# Patient Record
Sex: MALE | Race: BLACK OR AFRICAN AMERICAN
[De-identification: names, ages, dates, MRNs, and addresses within clinical notes are randomized per-mention and may not be internally consistent; named-entity substitution may affect disease eponyms.]

---

## 2020-01-16 ENCOUNTER — HOSPITAL ENCOUNTER (INPATIENT)
Dept: HOSPITAL 35 - ER | Age: 65
LOS: 4 days | Discharge: HOME | DRG: 281 | End: 2020-01-20
Attending: HOSPITALIST | Admitting: HOSPITALIST
Payer: COMMERCIAL

## 2020-01-16 VITALS — SYSTOLIC BLOOD PRESSURE: 140 MMHG | DIASTOLIC BLOOD PRESSURE: 78 MMHG

## 2020-01-16 VITALS — DIASTOLIC BLOOD PRESSURE: 94 MMHG | SYSTOLIC BLOOD PRESSURE: 169 MMHG

## 2020-01-16 VITALS — HEIGHT: 60 IN | WEIGHT: 162.9 LBS | BODY MASS INDEX: 31.98 KG/M2

## 2020-01-16 VITALS — SYSTOLIC BLOOD PRESSURE: 146 MMHG | DIASTOLIC BLOOD PRESSURE: 81 MMHG

## 2020-01-16 VITALS — SYSTOLIC BLOOD PRESSURE: 146 MMHG | DIASTOLIC BLOOD PRESSURE: 85 MMHG

## 2020-01-16 VITALS — DIASTOLIC BLOOD PRESSURE: 78 MMHG | SYSTOLIC BLOOD PRESSURE: 140 MMHG

## 2020-01-16 VITALS — SYSTOLIC BLOOD PRESSURE: 136 MMHG | DIASTOLIC BLOOD PRESSURE: 80 MMHG

## 2020-01-16 DIAGNOSIS — E83.42: ICD-10-CM

## 2020-01-16 DIAGNOSIS — F10.10: ICD-10-CM

## 2020-01-16 DIAGNOSIS — Z79.899: ICD-10-CM

## 2020-01-16 DIAGNOSIS — I42.8: ICD-10-CM

## 2020-01-16 DIAGNOSIS — I11.0: ICD-10-CM

## 2020-01-16 DIAGNOSIS — Z83.2: ICD-10-CM

## 2020-01-16 DIAGNOSIS — K80.12: ICD-10-CM

## 2020-01-16 DIAGNOSIS — I25.10: ICD-10-CM

## 2020-01-16 DIAGNOSIS — M19.90: ICD-10-CM

## 2020-01-16 DIAGNOSIS — I21.4: Primary | ICD-10-CM

## 2020-01-16 DIAGNOSIS — I50.9: ICD-10-CM

## 2020-01-16 DIAGNOSIS — E87.6: ICD-10-CM

## 2020-01-16 DIAGNOSIS — J32.9: ICD-10-CM

## 2020-01-16 DIAGNOSIS — Z83.6: ICD-10-CM

## 2020-01-16 DIAGNOSIS — Z90.89: ICD-10-CM

## 2020-01-16 DIAGNOSIS — F17.210: ICD-10-CM

## 2020-01-16 LAB
ALBUMIN SERPL-MCNC: 3.9 G/DL (ref 3.4–5)
ALT SERPL-CCNC: 71 U/L (ref 30–65)
ANION GAP SERPL CALC-SCNC: 11 MMOL/L (ref 7–16)
AST SERPL-CCNC: 102 U/L (ref 15–37)
BILIRUB SERPL-MCNC: 0.7 MG/DL
BUN SERPL-MCNC: 9 MG/DL (ref 7–18)
CALCIUM SERPL-MCNC: 9.5 MG/DL (ref 8.5–10.1)
CHLORIDE SERPL-SCNC: 98 MMOL/L (ref 98–107)
CHOLEST SERPL-MCNC: 131 MG/DL (ref ?–200)
CO2 SERPL-SCNC: 30 MMOL/L (ref 21–32)
CREAT SERPL-MCNC: 0.9 MG/DL (ref 0.7–1.3)
ERYTHROCYTE [DISTWIDTH] IN BLOOD BY AUTOMATED COUNT: 16.4 % (ref 10.5–14.5)
GLUCOSE SERPL-MCNC: 126 MG/DL (ref 74–106)
GRANULOCYTES NFR BLD MANUAL: 59 % (ref 36–66)
HCT VFR BLD CALC: 45.1 % (ref 42–52)
HDLC SERPL-MCNC: 58 MG/DL (ref 40–?)
HGB BLD-MCNC: 15.3 GM/DL (ref 14–18)
LDLC SERPL-MCNC: 66 MG/DL (ref ?–100)
LIPASE: 149 U/L (ref 73–393)
LYMPHOCYTES NFR BLD AUTO: 22 % (ref 24–44)
MAGNESIUM SERPL-MCNC: 1.6 MG/DL (ref 1.8–2.4)
MCH RBC QN AUTO: 34.7 PG (ref 26–34)
MCHC RBC AUTO-ENTMCNC: 33.9 G/DL (ref 28–37)
MCV RBC: 102.3 FL (ref 80–100)
MONOCYTES NFR BLD: 18 % (ref 1–8)
NEUTROPHILS # BLD: 3.2 THOU/UL (ref 1.4–8.2)
NEUTS BAND NFR BLD: 1 % (ref 0–8)
PHOSPHATE SERPL-MCNC: 3.5 MG/DL (ref 2.5–4.9)
PLATELET # BLD EST: NORMAL 10*3/UL
PLATELET # BLD: 221 THOU/UL (ref 150–400)
POTASSIUM SERPL-SCNC: 3.1 MMOL/L (ref 3.5–5.1)
PROT SERPL-MCNC: 8.8 G/DL (ref 6.4–8.2)
RBC # BLD AUTO: 4.41 MIL/UL (ref 4.5–6)
RBC MORPH BLD: NORMAL
SODIUM SERPL-SCNC: 139 MMOL/L (ref 136–145)
TC:HDL: 2.3 RATIO
TRIGL SERPL-MCNC: 36 MG/DL (ref ?–150)
TROPONIN I SERPL-MCNC: 0.13 NG/ML (ref ?–0.06)
VLDLC SERPL CALC-MCNC: 7 MG/DL (ref ?–40)
WBC # BLD AUTO: 5.4 THOU/UL (ref 4–11)

## 2020-01-16 PROCEDURE — 4A023N8 MEASUREMENT OF CARDIAC SAMPLING AND PRESSURE, BILATERAL, PERCUTANEOUS APPROACH: ICD-10-PCS | Performed by: HOSPITALIST

## 2020-01-16 PROCEDURE — 10081 I&D PILONIDAL CYST COMP: CPT

## 2020-01-16 PROCEDURE — B4101ZZ FLUOROSCOPY OF ABDOMINAL AORTA USING LOW OSMOLAR CONTRAST: ICD-10-PCS | Performed by: HOSPITALIST

## 2020-01-16 PROCEDURE — B2111ZZ FLUOROSCOPY OF MULTIPLE CORONARY ARTERIES USING LOW OSMOLAR CONTRAST: ICD-10-PCS | Performed by: HOSPITALIST

## 2020-01-16 PROCEDURE — B2151ZZ FLUOROSCOPY OF LEFT HEART USING LOW OSMOLAR CONTRAST: ICD-10-PCS | Performed by: HOSPITALIST

## 2020-01-16 NOTE — CATHLAB
[facility]
[address]
[place]  [address]
Phone:  [phone]                    INVASIVE PROCEDURE REPORT     
_______________________________________________________________________________
 
Name:            KAPLANQUEENIE HERLINDA BARRIENTOS           Room #:        216-P       [facility] [place]
[facility]#:           [id number]          Account #:     [id number]  
Admission:       01/16/20         Attend Phys:   Cornelio Barahona MD 
Discharge:                  Date of Birth: [birth year]  
                         Report #:      [id number]
        [id number]
_______________________________________________________________________________
THIS REPORT FOR:   //name//                          
 
 
--------------- APPROVED REPORT --------------
 
 
Study performed:  01/16/2020 10:32:24
 
Patient Details
Patient Status: In-Patient                  Room #: 
The patient is a 64 year-old male
 
Event Personnel
Aramis Monaco  Interventional Cardiologist, Mickie Live RTR, 
RCIS Scrub, Sandifer, David Monitor, Silvia Magana RN 
RN
 
Procedures Performed
Right and Left Heart Cath w/or w/o Coronarie [id number] [facility] Aortogram 
Abdominal Peripheral Angio [id number]
 
Indication
Chest pain
 
Procedure Narrative
The Right Groin^ was infiltrated with 1% Lidocaine subcutaneous 
anesthesia. A PINNACLE 6FR Sheath #[id number] sheath was inserted into 
the RFA^. Coronary angiography was performed using coronary 
diagnostic catheters. The right coronary system was accessed and 
visualized with a 6FR 3DRC #[id number] catheter. The left coronary system 
was accessed and visualized with a JL4 catheter. The left ventricle 
was accessed and visualized with a PIGTAIL catheter. Left 
ventriculogram was performed in 30 degree projection. An aortogram of 
the abdominal aorta was performed. Closure device was deployed with a 
6 Fr MYNXGRIP 6/7F #[id number]. The patient tolerated the procedure well 
and there were no complications associated with the procedure. 
 
Intraoperative Conscious Sedation
Sedation start time:  11.14           Case end Time:  
11.54    
Fentanyl  100 mcg    Versed  2 mg  
 
Fluoro Time:    3.12 minutes    
Dose:     DAP 3.00 cGycm2  343 mGy  
Contrast Type and Amount:  Omnipaque 125 ml   
 
 
 
[facility]
[address]
[place]  [address]
Phone:  [phone]                    INVASIVE PROCEDURE REPORT     
_______________________________________________________________________________
 
Name:            QUEENIE KAPLAN            Room #:        216-P       [facility] [place]
..#:           [id number]          Account #:     [id number]  
Admission:       01/16/20         Attend Phys:   Cornelio Barahona MD 
Discharge:                  Date of Birth: [birth year]  
                         Report #:      [id number]
        [id number]
_______________________________________________________________________________
Hemodynamics
The right atrial mean pressure is 3 mmHg. The right ventricular 
pressure is 34/3 mmHg. The pulmonary artery pressure is 30/8 mmHg 
with a mean of 18 mmHg. The mean pulmonary capillary wedge pressure 
is 11 mmHg. The aortic pressure is 137/75 mmHg with a mean of 100 
mmHg. The left ventricular pressure is 125/6 mmHg with a mean of 
mmHg. The left ventricular end diastolic pressure is 19 mmHg. The 
cardiac output using thermo method is 5.20 L/min.
 
Conclusion
#1 mildly dilated left ventricle with moderate global hypokinesis EF 
25-30% range.
#2 abdominal aorta mildly ectatic there is moderate ostial iliac 
disease of 40% bilaterally no aneurysm
#3 left main long with a distal eccentric concentric lesion 30-40% 
giving rise to LAD circumflex and ramus branch
#4 LAD extends around the apex with mild irregularity proximal 
calcification 3040% ostial disease
#5 a ramus intermedius also is a 3040% ostial disease moderate 
distribution
#6 circumflex OM nondominant with an eccentric 30% lesion off the 
left main otherwise preserved vessel.
#7 dominant right with a diffuse distal disease only small PDA 
system. No occlusive disease noted
#8 successful right heart catheterization with hemodynamics and 
cardiac output by thermodilution as described above.
 
Recommendations and plan: Continue aggressive risk factor 
modification. No coronary intervention. Pulmonary pressures are 
within normal range. 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 
be alcohol in nature. Transfer to CCU in stable condition.
 
 
 
 
 
 
 
 
 
 
 
 
 
  <ELECTRONICALLY SIGNED>
   By: Aramis Monaco MD, FACC   
  01/16/20     1325
D: 01/16/20 1325                           _____________________________________
T: 01/16/20 1325                           Aramis Monaco MD, FACC     /INF

## 2020-01-16 NOTE — EKG
[place] [address] [email or web address] [address]
[place]  [address]
Phone:  [phone]                    ELECTROCARDIOGRAM REPORT      
_______________________________________________________________________________
 
Name:       QUEENIE KAPLAN HELRINDA            Room #:         216-P       [facility] [place]  
M.R.#:      [id number]     Account #:      [id number]  
Admission:  20    Attend Phys:    Cornelio Barhaona MD     
Discharge:              Date of Birth:  [birth year]  
                                                          Report #: [id number]
   [id number]
_______________________________________________________________________________
THIS REPORT FOR:   //name//                          
 
                         [facility] ED
                                       
Test Date:    2020               Test Time:    00:30:03
Pat Name:     QUEENIE KAPLAN                Department:   
Patient ID:   [id number]            Room:         216
Gender:       M                        Technician:   JOHNNY
:          1955               Requested By: Miguel Carias
Order Number: [id number] MD:   Christopher Jaramillo
                                 Measurements
Intervals                              Axis          
Rate:         97                       P:            
WY:                                    QRS:          49
QRSD:         110                      T:            91
QT:           419                                    
QTc:          533                                    
                           Interpretive Statements
Normal sinus rhythm.  
Left ventricular hypertrophy
Nonspecific T abnormalities, lateral leads
Baseline wander in lead(s) V2
No previous ECG available for comparison
 
Electronically Signed On 2020 17:21:55 CST by Christopher Jaramillo
[email or web address]
 
 
 
 
 
 
 
 
 
 
 
 
 
 
 
 
 
  <ELECTRONICALLY SIGNED>
   By: Christopher Jaramillo MD        
  20     1721
D: 20                           _____________________________________
T: 20                           Christopher Jaramillo MD          /TREMAINE

## 2020-01-16 NOTE — EKG
[place] [address] [address] [facility] [address]
[place]  [address]
Phone:  [phone]                    ELECTROCARDIOGRAM REPORT      
_______________________________________________________________________________
 
Name:       KAPLANQUEENIE HERLINDA BARRIENTOS           Room #:         216-P       [facility] [place]  
M.R.#:      [id number]     Account #:      [id number]  
Admission:  20    Attend Phys:    Cornelio Barahona MD     
Discharge:              Date of Birth:  [birth year]  
                                                          Report #: [id number]
   [id number]
_______________________________________________________________________________
THIS REPORT FOR:   //name//                          
 
                          [facility]
                                       
Test Date:    2020               Test Time:    09:18:43
Pat Name:     QUEENIE KAPLAN                Department:   
Patient ID:   [id number]            Room:         216 P
Gender:       M                        Technician:   KATYA MARY
:          1955               Requested By: Lauren Bragg
Order Number: [id number] MD:   Christopher Jaramillo
                                 Measurements
Intervals                              Axis          
Rate:         110                      P:            71
LA:           140                      QRS:          17
QRSD:         100                      T:            69
QT:           387                                    
QTc:          524                                    
                           Interpretive Statements
Sinus tachycardia
Ventricular premature complex
Aberrant complex
Probable left atrial enlargement
Left ventricular hypertrophy
No previous ECG available for comparison
 
Electronically Signed On 2020 17:25:28 CST by Christopher Jaramillo
[email or web address]
 
 
 
 
 
 
 
 
 
 
 
 
 
 
 
 
  <ELECTRONICALLY SIGNED>
   By: Christopher Jaramillo MD        
  20     1725
D: 20                           _____________________________________
T: 20                           MD MILES Guevara

## 2020-01-16 NOTE — 2DMMODE
[facility]
[address]
[place]  [address]
Phone:  [phone] 2 D/M-MODE ECHOCARDIOGRAM     
_______________________________________________________________________________
 
Name:            EUSEBIOQUEENIE HERLINDA BARRIENTOS           Room #:        216-P       [facility] [place]
[facility]#:           [id number]          Account #:     [id number]  
Admission:       01/16/20         Attend Phys:   Cornelio Barahona MD 
Discharge:                  Date of Birth: [birth year]  
                         Report #:      [id number]
        [id number]
_______________________________________________________________________________
THIS REPORT FOR:   //name//                          
 
 
--------------- APPROVED REPORT --------------
 
 
Study performed:  01/16/2020 09:41:08
 
EXAM: Comprehensive 2D, Doppler, and color-flow 
Echocardiogram 
Patient Location: Echo lab   
      Status:  routine
 
       BSA:         1.81
HR: 105 bpm  BP:          146/81 mmHg 
Rhythm: NSR/Tachy/PVCs.     
 
Other Information 
Study Quality: Good
 
Indications
Elevated troponin.
 
2D Dimensions
RVDd:  35.92 mm  
IVSd:  10.17 (7-11mm) LVOT Diam:  21.40 (18-24mm) 
LVDd:  57.31 mm  
PWd:  8.95 (7-11mm) Ascending Ao:  32.38 (22-36mm)
LVDs:  51.23 (25-40mm) 
Aortic Root:  36.35 mm 
 
Volumes
Left Atrial Volume (Systole) 
Single Plane 4CH:  61.27 mL Single Plane 2CH:  77.67 mL
    LA ESV Index:  42.00 mL/m2
 
Aortic Valve
AoV Peak Vishnu.:  1.25 m/s 
AO Peak Gr.:  6.25 mmHg  LVOT Max PG:  3.12 mmHg
    LVOT Max V:  0.88 m/s
BEE Vmax: 2.54 cm2  
 
Mitral Valve
IVRT:  51.90 ms 
 
Pulmonary Valve
 
 
[facility]
[address]
[place]  [address]
Phone:  [phone]                    2 D/M-MODE ECHOCARDIOGRAM     
_______________________________________________________________________________
 
Name:            QUEENIE KAPLAN            Room #:        216-P       [facility] [place]
..#:           [id number]          Account #:     [id number]  
Admission:       01/16/20         Attend Phys:   Cornelio Barahona MD 
Discharge:                  Date of Birth: [birth year]  
                         Report #:      [id number]
        [id number]
_______________________________________________________________________________
PV Peak Vishnu.:  0.85 m/s PV Peak Gr.:  2.91 mmHg
 
Pulmonary Vein
P Vein S:    0.62 m/s 
P Vein D:   0.31 m/s 
P Vein S/D Ratio:  2.00 
 
Tricuspid Valve
 RAP Estimate:  5.00 mmHg
 
Left Ventricle
Left ventricle is at the upper limits of normal. There is normal left 
ventricular wall thickness. Left ventricular systolic function is 
mod-severely decreased.globaaly LVEF is 30%. This study is not 
technically sufficient to allow evaluation of the LV diastolic 
function.
 
Right Ventricle
The right ventricle is normal size. The right ventricular systolic 
function is normal.
 
Atria
Left atrium is mildly dilated. The right atrium size is 
normal.
 
Aortic Valve
The aortic valve is normal in structure. No aortic regurgitation is 
present. There is no aortic valvular stenosis.
 
Mitral Valve
The mitral valve is normal in structure. Moderate mitral 
regurgitation.
 
Tricuspid Valve
The tricuspid valve is normal in structure. There is no tricuspid 
valve regurgitation noted. Unable to assess PA pressure.
 
Pulmonic Valve
The pulmonary valve is normal in structure. Trace pulmonic 
regurgitation.
 
Great Vessels
The aortic root is normal in size. The ascending aorta is normal in 
size. IVC is normal in size and collapses >50% with 
inspiration.
 
 
 
[facility]
[address]
[place]  [address]
Phone:  [phone]                    2 D/M-MODE ECHOCARDIOGRAM     
_______________________________________________________________________________
 
Name:            QUEENIE KAPLAN            Room #:        216-P       [facility] [place]
[facility].#:           [id number]          Account #:     [id number]  
Admission:       01/16/20         Attend Phys:   Cornelio Barahona MD 
Discharge:                  Date of Birth: [birth year]  
                         Report #:      [id number]
        [id number]
_______________________________________________________________________________
Pericardium
There is no pericardial effusion.
 
<Conclusion>
Left ventricle is at the upper limits of normal.
Left ventricular systolic function is mod-severely 
decreased.globaaly
LVEF is 30%.
This study is not technically sufficient to allow evaluation of the 
LV diastolic function.
The right ventricle is normal size.
Left atrium is mildly dilated.
The aortic valve is normal in structure.
Moderate mitral regurgitation.
There is no tricuspid valve regurgitation noted.
Unable to assess PA pressure.
The aortic root is normal in size.
There is no pericardial effusion.
 
 
 
 
 
 
 
 
 
 
 
 
 
 
 
 
 
 
 
 
 
 
 
 
 
 
  <ELECTRONICALLY SIGNED>
   By: Aramis Monaco MD, [facility]   
  01/16/20     1053
D: 01/16/20 1053                           _____________________________________
T: 01/16/20 1053                           Aramis Monaco MD, FACC     /INF

## 2020-01-17 VITALS — SYSTOLIC BLOOD PRESSURE: 138 MMHG | DIASTOLIC BLOOD PRESSURE: 91 MMHG

## 2020-01-17 VITALS — SYSTOLIC BLOOD PRESSURE: 138 MMHG | DIASTOLIC BLOOD PRESSURE: 73 MMHG

## 2020-01-17 VITALS — SYSTOLIC BLOOD PRESSURE: 123 MMHG | DIASTOLIC BLOOD PRESSURE: 60 MMHG

## 2020-01-17 LAB
ANION GAP SERPL CALC-SCNC: 8 MMOL/L (ref 7–16)
BUN SERPL-MCNC: 9 MG/DL (ref 7–18)
CALCIUM SERPL-MCNC: 8.8 MG/DL (ref 8.5–10.1)
CHLORIDE SERPL-SCNC: 102 MMOL/L (ref 98–107)
CO2 SERPL-SCNC: 26 MMOL/L (ref 21–32)
CREAT SERPL-MCNC: 1 MG/DL (ref 0.7–1.3)
ERYTHROCYTE [DISTWIDTH] IN BLOOD BY AUTOMATED COUNT: 16.6 % (ref 10.5–14.5)
GLUCOSE SERPL-MCNC: 85 MG/DL (ref 74–106)
HCT VFR BLD CALC: 43.5 % (ref 42–52)
HGB BLD-MCNC: 14.7 GM/DL (ref 14–18)
MAGNESIUM SERPL-MCNC: 1.7 MG/DL (ref 1.8–2.4)
MCH RBC QN AUTO: 34.5 PG (ref 26–34)
MCHC RBC AUTO-ENTMCNC: 33.7 G/DL (ref 28–37)
MCV RBC: 102.4 FL (ref 80–100)
PLATELET # BLD: 201 THOU/UL (ref 150–400)
POTASSIUM SERPL-SCNC: 3.7 MMOL/L (ref 3.5–5.1)
RBC # BLD AUTO: 4.25 MIL/UL (ref 4.5–6)
SODIUM SERPL-SCNC: 136 MMOL/L (ref 136–145)
WBC # BLD AUTO: 11 THOU/UL (ref 4–11)

## 2020-01-18 VITALS — SYSTOLIC BLOOD PRESSURE: 142 MMHG | DIASTOLIC BLOOD PRESSURE: 80 MMHG

## 2020-01-18 VITALS — SYSTOLIC BLOOD PRESSURE: 156 MMHG | DIASTOLIC BLOOD PRESSURE: 75 MMHG

## 2020-01-18 VITALS — SYSTOLIC BLOOD PRESSURE: 123 MMHG | DIASTOLIC BLOOD PRESSURE: 71 MMHG

## 2020-01-18 VITALS — SYSTOLIC BLOOD PRESSURE: 145 MMHG | DIASTOLIC BLOOD PRESSURE: 89 MMHG

## 2020-01-18 VITALS — SYSTOLIC BLOOD PRESSURE: 156 MMHG | DIASTOLIC BLOOD PRESSURE: 89 MMHG

## 2020-01-18 LAB
ALBUMIN SERPL-MCNC: 2.4 G/DL (ref 3.4–5)
ANION GAP SERPL CALC-SCNC: 11 MMOL/L (ref 7–16)
BILIRUB UR-MCNC: (no result) MG/DL
BUN SERPL-MCNC: 11 MG/DL (ref 7–18)
CALCIUM SERPL-MCNC: 8.2 MG/DL (ref 8.5–10.1)
CHLORIDE SERPL-SCNC: 101 MMOL/L (ref 98–107)
CO2 SERPL-SCNC: 23 MMOL/L (ref 21–32)
COLOR UR: YELLOW
CREAT SERPL-MCNC: 0.9 MG/DL (ref 0.7–1.3)
ERYTHROCYTE [DISTWIDTH] IN BLOOD BY AUTOMATED COUNT: 16.5 % (ref 10.5–14.5)
GLUCOSE SERPL-MCNC: 87 MG/DL (ref 74–106)
HCT VFR BLD CALC: 38.9 % (ref 42–52)
HGB BLD-MCNC: 12.9 GM/DL (ref 14–18)
KETONES UR STRIP-MCNC: (no result) MG/DL
MCH RBC QN AUTO: 34.2 PG (ref 26–34)
MCHC RBC AUTO-ENTMCNC: 33.3 G/DL (ref 28–37)
MCV RBC: 102.7 FL (ref 80–100)
OPIATES UR-MCNC: POSITIVE NG/ML
PHOSPHATE SERPL-MCNC: 2.6 MG/DL (ref 2.5–4.9)
PLATELET # BLD: 164 THOU/UL (ref 150–400)
POTASSIUM SERPL-SCNC: 3.5 MMOL/L (ref 3.5–5.1)
RBC # BLD AUTO: 3.78 MIL/UL (ref 4.5–6)
RBC # UR STRIP: (no result) /UL
SODIUM SERPL-SCNC: 135 MMOL/L (ref 136–145)
SP GR UR STRIP: 1.02 (ref 1–1.03)
TROPONIN I SERPL-MCNC: 0.17 NG/ML (ref ?–0.06)
URINE CLARITY: CLEAR
URINE GLUCOSE-RANDOM*: NEGATIVE
URINE LEUKOCYTES-REFLEX: NEGATIVE
URINE NITRITE-REFLEX: NEGATIVE
URINE PROTEIN (DIPSTICK): (no result)
UROBILINOGEN UR STRIP-ACNC: 4 E.U./DL (ref 0.2–1)
WBC # BLD AUTO: 11.6 THOU/UL (ref 4–11)

## 2020-01-19 VITALS — DIASTOLIC BLOOD PRESSURE: 94 MMHG | SYSTOLIC BLOOD PRESSURE: 149 MMHG

## 2020-01-19 VITALS — SYSTOLIC BLOOD PRESSURE: 142 MMHG | DIASTOLIC BLOOD PRESSURE: 90 MMHG

## 2020-01-19 VITALS — SYSTOLIC BLOOD PRESSURE: 141 MMHG | DIASTOLIC BLOOD PRESSURE: 82 MMHG

## 2020-01-19 VITALS — DIASTOLIC BLOOD PRESSURE: 90 MMHG | SYSTOLIC BLOOD PRESSURE: 143 MMHG

## 2020-01-19 VITALS — SYSTOLIC BLOOD PRESSURE: 150 MMHG | DIASTOLIC BLOOD PRESSURE: 95 MMHG

## 2020-01-20 VITALS — DIASTOLIC BLOOD PRESSURE: 95 MMHG | SYSTOLIC BLOOD PRESSURE: 177 MMHG

## 2020-01-20 VITALS — SYSTOLIC BLOOD PRESSURE: 149 MMHG | DIASTOLIC BLOOD PRESSURE: 99 MMHG

## 2020-01-20 VITALS — SYSTOLIC BLOOD PRESSURE: 177 MMHG | DIASTOLIC BLOOD PRESSURE: 95 MMHG

## 2020-01-20 LAB
ANION GAP SERPL CALC-SCNC: 8 MMOL/L (ref 7–16)
BUN SERPL-MCNC: 5 MG/DL (ref 7–18)
CALCIUM SERPL-MCNC: 8.6 MG/DL (ref 8.5–10.1)
CHLORIDE SERPL-SCNC: 104 MMOL/L (ref 98–107)
CO2 SERPL-SCNC: 27 MMOL/L (ref 21–32)
CREAT SERPL-MCNC: 0.9 MG/DL (ref 0.7–1.3)
GLUCOSE SERPL-MCNC: 96 MG/DL (ref 74–106)
POTASSIUM SERPL-SCNC: 3 MMOL/L (ref 3.5–5.1)
SODIUM SERPL-SCNC: 139 MMOL/L (ref 136–145)